# Patient Record
Sex: FEMALE | Race: WHITE | Employment: UNEMPLOYED
[De-identification: names, ages, dates, MRNs, and addresses within clinical notes are randomized per-mention and may not be internally consistent; named-entity substitution may affect disease eponyms.]

---

## 2021-06-24 ENCOUNTER — NURSE TRIAGE (OUTPATIENT)
Dept: OTHER | Facility: CLINIC | Age: 51
End: 2021-06-24

## 2021-06-25 NOTE — TELEPHONE ENCOUNTER
Brief description of triage: was showering and felt a mass bulging out of vagina, no pain, no difficulty urinating, no discharge, no bleeding, only aware of it's presence due to feeling it while showering. Reports when standing bulge remains inside vaginal vault and causes no pain or discomfort. Triage indicates for patient to See PCP/GYN within 2 weeks. Advised to keep area clean and dry, wear cotton underwear, do not insert anything into vagina until evaluated, avoid heavy lifting or straining, consider stool softeners to help prevent constipation and ease bowel movements. Call back for pain, discharge, bleeding, worsening of condition or other concerns. Care advice provided, patient verbalizes understanding; denies any other questions or concerns; instructed to call back for any new or worsening symptoms. This triage is a result of a call to [address] [place] [address]. Please do not respond to the triage nurse through this encounter. Any subsequent communication should be directly with the patient. Reason for Disposition   Feels like something inside is falling out of vagina (e.g., pressure, heaviness, fullness)    Answer Assessment - Initial Assessment Questions  1. SYMPTOM: \"What's the main symptom you're concerned about? \" (e.g., pain, itching, dryness)      Bulge found in vagina    2. LOCATION: \"Where is the  bulge located? \" (e.g., inside/outside, left/right)      Inside     3. ONSET: \"When did the  bulge in vagina  start? \"      Noted 20mins ago    4. PAIN: \"Is there any pain? \" If so, ask: \"How bad is it? \" (Scale: 1-10; mild, moderate, severe)      No pain    5. ITCHING: \"Is there any itching? \" If so, ask: \"How bad is it? \" (Scale: 1-10; mild, moderate, severe)      No itching    6. CAUSE: \"What do you think is causing the discharge? \" \"Have you had the same problem before? What happened then? \"      No discharge    7. OTHER SYMPTOMS: \"Do you have any other symptoms? \" (e.g., fever, itching, vaginal bleeding, pain with urination, injury to genital area, vaginal foreign body)      No other symptoms    8. PREGNANCY: \"Is there any chance you are pregnant? \" \"When was your last menstrual period? \"      no    Protocols used: VAGINAL SYMPTOMS-ADULT-AH